# Patient Record
Sex: MALE | Race: BLACK OR AFRICAN AMERICAN | Employment: UNEMPLOYED | URBAN - METROPOLITAN AREA
[De-identification: names, ages, dates, MRNs, and addresses within clinical notes are randomized per-mention and may not be internally consistent; named-entity substitution may affect disease eponyms.]

---

## 2018-05-10 ENCOUNTER — HOSPITAL ENCOUNTER (OUTPATIENT)
Dept: CT IMAGING | Age: 83
Discharge: HOME | End: 2018-05-10
Attending: FAMILY MEDICINE
Payer: MEDICARE

## 2018-05-10 DIAGNOSIS — S09.90XA HEAD INJURY DUE TO TRAUMA: ICD-10-CM

## 2018-05-10 PROCEDURE — 70450 CT HEAD/BRAIN W/O DYE: CPT

## 2019-01-04 ENCOUNTER — HOSPITAL ENCOUNTER (OUTPATIENT)
Dept: GENERAL RADIOLOGY | Age: 84
Discharge: HOME | End: 2019-01-04
Attending: INTERNAL MEDICINE
Payer: MEDICARE

## 2019-01-04 DIAGNOSIS — R13.10 DYSPHAGIA: ICD-10-CM

## 2019-01-04 PROCEDURE — 74230 X-RAY XM SWLNG FUNCJ C+: CPT

## 2019-01-04 PROCEDURE — 92611 MOTION FLUOROSCOPY/SWALLOW: CPT | Performed by: SPEECH-LANGUAGE PATHOLOGIST

## 2019-01-04 NOTE — PROGRESS NOTES
[address], [facility].    Speech Pathology Modified barium swallow Study  Patient: Santo More (89 y.o. male)  Date: 1/4/2019  Referring Provider:  Dr. Morgan Wrightoring:   Patient alert, cooperative. dtr reports difficulty with swallowing, being seen by [facility] SLP with recommendation for honey thick liquids, however, dtr reports he is not drinking the thickened liquids because he doesn't like them. Reports frequent coughing after eating and drinking and significant impulsivity with intake. dtr reports  SLP requested MBS Study to assess for aspiration risks. Needs foods cut very finely but still with some choking noted. OBJECTIVE:   Past Medical History:   Past Medical History:   Diagnosis Date    BPH (benign prostatic hyperplasia)     CAD (coronary artery disease)     DM (diabetes mellitus) ([facility]     HTN (hypertension)     Hypercholesteremia     [facility] ([facility]    No past surgical history on file. Current Dietary Status:  Finely cut foods/thin vs thick liquids   Radiologist: Dr. Adrienne Capellan Views: Lateral;Fluoro  Patient Position: upright in hausted chair     Trial 1: Trial 2:   Consistency Presented: Thin liquid Consistency Presented: Nectar thick liquid;Puree; Solid   How Presented: Self-fed/presented;Cup/sip;Cup/gulp How Presented: Self-fed/presented;Cup/sip;Cup/gulp; Spoon   Consistency Amount: (200cc thin Ba) Consistency Amount: (300cc nectar thick Ba, 1 tbsp Ba paste)   Bolus Acceptance: Impaired(impulsive ) Bolus Acceptance: Impaired(impulsive )   Bolus Formation/Control: Impaired: Premature spillage(decreased bolus control ) Bolus Formation/Control: Impaired: Premature spillage; Incomplete;Mastication   Propulsion: Delayed (# of seconds); Discoordination Propulsion: Delayed (# of seconds); Discoordination   Oral Residue: Lingual(moderate diffuse ) Oral Residue: Lingual(moderate diffuse )   Initiation of Swallow: Triggered at vallecula Initiation of Swallow: Triggered at valleculae   Timing: Pooling 1-5 sec Timing: Pooling 1-5 sec   Penetration: After swallow; To cords Penetration: None   Aspiration/Timing: No evidence of aspiration Aspiration/Timing: No evidence of aspiration   Pharyngeal Clearance: Vallecular residue;Pyriform residue ;10-50% Pharyngeal Clearance: Vallecular residue;Pyriform residue ;10-50%   Attempted Modifications: Small sips and bites Attempted Modifications: Alternate liquids/solids; Double swallow;Small sips and bites   Effective Modifications: None       Cues for Modifications: Maximum   Decreased Tongue Base Retraction?: Yes  Laryngeal Elevation: Inadequate epiglottic inversion; Incomplete laryngeal closure; Reduced excursion with laryngeal vestibule gap  Aspiration/Penetration Score: 4 (Penetration/No residue-Contrast contacts the folds, and is ejected)  Pharyngeal Symmetry: Not assessed  Pharyngeal-Esophageal Segment: No impairment  Pharyngeal Dysfunction: Decreased tongue base retraction;Decreased strength;Decreased elevation/closure    Oral Phase Severity: Moderate  Pharyngeal Phase Severity: Moderate    ASSESSMENT :  Based on the objective data described above, the patient presents with moderate oral and pharyngeal dysphagia characterized by poor mastication with incomplete breakdown of solids and moderate diffuse oral residue with all consistencies that he needed repeated cues to clear. Delayed swallow initiation, reduced tongue base retraction, reduced hyolaryngeal elevation/excursion, reduced epiglottic inversion and reduced laryngeal closure. Patient with moderate diffuse pharyngeal residue with all consistencies that cleared to mild with additional swallow. Silent penetration to the cords with thin liquids that occurred after the swallow secondary to tongue base and vallecular residue. No penetration or aspiration with purees, solids or nectar thick liquids.   Coughing was noted intermittently in the absence of aspiration. Increased risks for aspiration related to significant impulsivity with intake as well as decreased awareness of oral and pharyngeal residue. PLAN/RECOMMENDATIONS :  --recommend puree/nectar thick liquid diet. Cue patient for small, single bites and sips. Additional swallow every 1-2 bites/sips to clear residue. Further management per treating [place] SLP. COMMUNICATION/EDUCATION:   The above findings and recommendations were discussed with: dtr who verbalized understanding. Thank you for this referral.  Cece Perry M.CD.  CCC-SLP   Time Calculation: 30 mins